# Patient Record
Sex: MALE | Race: WHITE | HISPANIC OR LATINO | Employment: OTHER | URBAN - METROPOLITAN AREA
[De-identification: names, ages, dates, MRNs, and addresses within clinical notes are randomized per-mention and may not be internally consistent; named-entity substitution may affect disease eponyms.]

---

## 2017-03-07 NOTE — PATIENT DISCUSSION
DRY EYES : Discussed with patient the importance of keeping the eye moist and the symptoms associated with dry eyes including blurry vision, tearing, burning, and gareth sensation. Tear Lab was performed today to evaluate the severity of dryness. Advised patient to minimize use of any fans blowing directly on the face. Advised patient to continue with over the counter artificial tears 2-3 times daily.

## 2019-05-06 NOTE — PATIENT DISCUSSION
DRY EYES : Discussed with patient the importance of keeping the eye moist and the symptoms associated with dry eyes including blurry vision, tearing, burning, and gareth sensation. Advised patient to minimize use of any fans blowing directly on the face. Advised patient to continue with artificial tears 2-3 times daily.

## 2019-05-06 NOTE — PATIENT DISCUSSION
CRANIAL NERVE 6 PALSY: NO OTHER SYSTEMIC RELATED ABNORMALITIES. OBSERVE CLOSELY . INFORM PRIMARY CARE PHYSICIAN OF DIAGNOSIS AND CONSIDER SYSTEMIC WORK UP BY PRIMARY CARE PHYSICIAN. RETURN FOR FOLLOW-UP AS SCHEDULED.

## 2019-05-06 NOTE — PATIENT DISCUSSION
PTERYGIUM:  I have advised the patient that pterygium are usually stable over time and that surgical intervention may be necessary if condition becomes visually significant or severely inflamed. The patient should avoid dry, melissa conditions and excessive sunlight. Artificial tears and sunglasses may help prevent exacerbation of the condition.

## 2019-12-16 NOTE — PATIENT DISCUSSION
CRANIAL NERVE 6th PALSY:  EXTROCULAR MOVEMENTS ARE GOOD . NO DIPLOPIA . RETURN FOR COMPLETE IN MAY .

## 2022-08-30 NOTE — PATIENT DISCUSSION
Progressive - Daily wearOD-0.50+1.2530+[id number]/30 -2&nbsp;SN &nbsp; &nbsp; emiln The ECG showed sinus rhythm.

## 2022-09-07 ENCOUNTER — NEW PATIENT (OUTPATIENT)
Dept: URBAN - METROPOLITAN AREA CLINIC 26 | Facility: CLINIC | Age: 67
End: 2022-09-07

## 2022-09-07 VITALS
SYSTOLIC BLOOD PRESSURE: 139 MMHG | HEART RATE: 101 BPM | BODY MASS INDEX: 21.49 KG/M2 | DIASTOLIC BLOOD PRESSURE: 84 MMHG | WEIGHT: 129 LBS | HEIGHT: 65 IN

## 2022-09-07 DIAGNOSIS — Z79.4: ICD-10-CM

## 2022-09-07 DIAGNOSIS — H43.811: ICD-10-CM

## 2022-09-07 DIAGNOSIS — H43.12: ICD-10-CM

## 2022-09-07 DIAGNOSIS — E11.3513: ICD-10-CM

## 2022-09-07 PROCEDURE — 92134 CPTRZ OPH DX IMG PST SGM RTA: CPT

## 2022-09-07 PROCEDURE — J3490AVA AVASTIN

## 2022-09-07 PROCEDURE — 67028 INJECTION EYE DRUG: CPT

## 2022-09-07 PROCEDURE — 99204 OFFICE O/P NEW MOD 45 MIN: CPT

## 2022-09-07 PROCEDURE — 92235 FLUORESCEIN ANGRPH MLTIFRAME: CPT

## 2022-09-07 PROCEDURE — 67210 TREATMENT OF RETINAL LESION: CPT

## 2022-09-07 ASSESSMENT — VISUAL ACUITY
OD_SC: 20/70
OS_SC: 20/50+1
OS_PH: 20/25+1

## 2022-09-07 ASSESSMENT — TONOMETRY
OS_IOP_MMHG: 17
OD_IOP_MMHG: 18

## 2022-09-13 ENCOUNTER — CLINIC PROCEDURE ONLY (OUTPATIENT)
Dept: URBAN - METROPOLITAN AREA CLINIC 26 | Facility: CLINIC | Age: 67
End: 2022-09-13

## 2022-09-13 DIAGNOSIS — E11.3513: ICD-10-CM

## 2022-09-13 PROCEDURE — J3490AVA AVASTIN

## 2022-09-13 PROCEDURE — 67028 INJECTION EYE DRUG: CPT

## 2022-09-13 ASSESSMENT — TONOMETRY: OS_IOP_MMHG: 17

## 2022-10-12 ENCOUNTER — CLINIC PROCEDURE ONLY (OUTPATIENT)
Dept: URBAN - METROPOLITAN AREA CLINIC 26 | Facility: CLINIC | Age: 67
End: 2022-10-12

## 2022-10-12 DIAGNOSIS — E11.3513: ICD-10-CM

## 2022-10-12 DIAGNOSIS — H43.811: ICD-10-CM

## 2022-10-12 PROCEDURE — 92134 CPTRZ OPH DX IMG PST SGM RTA: CPT

## 2022-10-12 PROCEDURE — J3490AVA AVASTIN

## 2022-10-12 PROCEDURE — 67028 INJECTION EYE DRUG: CPT

## 2022-10-12 ASSESSMENT — TONOMETRY
OD_IOP_MMHG: 15
OD_IOP_MMHG: 23

## 2022-10-25 ENCOUNTER — CLINIC PROCEDURE ONLY (OUTPATIENT)
Dept: URBAN - METROPOLITAN AREA CLINIC 26 | Facility: CLINIC | Age: 67
End: 2022-10-25

## 2022-10-25 DIAGNOSIS — E11.3513: ICD-10-CM

## 2022-10-25 DIAGNOSIS — H43.12: ICD-10-CM

## 2022-10-25 PROCEDURE — 92250 FUNDUS PHOTOGRAPHY W/I&R: CPT

## 2022-10-25 PROCEDURE — 67028 INJECTION EYE DRUG: CPT

## 2022-10-25 PROCEDURE — J3490AVA AVASTIN

## 2022-10-25 ASSESSMENT — TONOMETRY: OS_IOP_MMHG: 13

## 2022-11-29 ENCOUNTER — CLINIC PROCEDURE ONLY (OUTPATIENT)
Dept: URBAN - METROPOLITAN AREA CLINIC 26 | Facility: CLINIC | Age: 67
End: 2022-11-29

## 2022-11-29 DIAGNOSIS — H43.811: ICD-10-CM

## 2022-11-29 DIAGNOSIS — E11.3513: ICD-10-CM

## 2022-11-29 PROCEDURE — J3490AVA AVASTIN

## 2022-11-29 PROCEDURE — 92134 CPTRZ OPH DX IMG PST SGM RTA: CPT

## 2022-11-29 PROCEDURE — 67028 INJECTION EYE DRUG: CPT

## 2022-11-29 ASSESSMENT — TONOMETRY: OD_IOP_MMHG: 19

## 2022-12-06 ENCOUNTER — CLINIC PROCEDURE ONLY (OUTPATIENT)
Dept: URBAN - METROPOLITAN AREA CLINIC 26 | Facility: CLINIC | Age: 67
End: 2022-12-06

## 2022-12-06 PROCEDURE — 67028 INJECTION EYE DRUG: CPT

## 2022-12-06 PROCEDURE — 92250 FUNDUS PHOTOGRAPHY W/I&R: CPT

## 2022-12-06 PROCEDURE — J3490AVA AVASTIN

## 2022-12-06 ASSESSMENT — TONOMETRY: OS_IOP_MMHG: 16

## 2023-04-05 ENCOUNTER — FOLLOW UP (OUTPATIENT)
Dept: URBAN - METROPOLITAN AREA CLINIC 26 | Facility: CLINIC | Age: 68
End: 2023-04-05

## 2023-04-05 VITALS — HEIGHT: 62 IN | WEIGHT: 128 LBS | BODY MASS INDEX: 23.55 KG/M2

## 2023-04-05 DIAGNOSIS — E11.3513: ICD-10-CM

## 2023-04-05 DIAGNOSIS — Z79.4: ICD-10-CM

## 2023-04-05 DIAGNOSIS — H43.12: ICD-10-CM

## 2023-04-05 DIAGNOSIS — H43.811: ICD-10-CM

## 2023-04-05 DIAGNOSIS — H40.023: ICD-10-CM

## 2023-04-05 PROCEDURE — 92014 COMPRE OPH EXAM EST PT 1/>: CPT

## 2023-04-05 PROCEDURE — 67028 INJECTION EYE DRUG: CPT

## 2023-04-05 PROCEDURE — 92134 CPTRZ OPH DX IMG PST SGM RTA: CPT

## 2023-04-05 PROCEDURE — 92250 FUNDUS PHOTOGRAPHY W/I&R: CPT

## 2023-04-05 ASSESSMENT — VISUAL ACUITY
OS_SC: 20/80+1
OS_PH: 20/30-1
OD_SC: 20/30-2

## 2023-04-05 ASSESSMENT — TONOMETRY
OS_IOP_MMHG: 13
OD_IOP_MMHG: 14

## 2023-04-12 ENCOUNTER — CLINIC PROCEDURE ONLY (OUTPATIENT)
Dept: URBAN - METROPOLITAN AREA CLINIC 26 | Facility: CLINIC | Age: 68
End: 2023-04-12

## 2023-04-12 DIAGNOSIS — Z79.4: ICD-10-CM

## 2023-04-12 DIAGNOSIS — E11.3513: ICD-10-CM

## 2023-04-12 PROCEDURE — 67028 INJECTION EYE DRUG: CPT

## 2023-04-12 ASSESSMENT — TONOMETRY: OS_IOP_MMHG: 18

## 2023-06-06 ENCOUNTER — FOLLOW UP (OUTPATIENT)
Dept: URBAN - METROPOLITAN AREA CLINIC 26 | Facility: CLINIC | Age: 68
End: 2023-06-06

## 2023-06-06 DIAGNOSIS — H43.12: ICD-10-CM

## 2023-06-06 DIAGNOSIS — E11.3513: ICD-10-CM

## 2023-06-06 DIAGNOSIS — H43.811: ICD-10-CM

## 2023-06-06 DIAGNOSIS — H40.023: ICD-10-CM

## 2023-06-06 DIAGNOSIS — Z79.4: ICD-10-CM

## 2023-06-06 PROCEDURE — 67210 TREATMENT OF RETINAL LESION: CPT

## 2023-06-06 PROCEDURE — 67028 INJECTION EYE DRUG: CPT

## 2023-06-06 PROCEDURE — 92134 CPTRZ OPH DX IMG PST SGM RTA: CPT

## 2023-06-06 PROCEDURE — 92014 COMPRE OPH EXAM EST PT 1/>: CPT

## 2023-06-06 ASSESSMENT — VISUAL ACUITY
OD_SC: 20/20-2
OS_PH: 20/30+2
OS_SC: 20/70-1

## 2023-06-06 ASSESSMENT — TONOMETRY
OS_IOP_MMHG: 16
OD_IOP_MMHG: 17

## 2023-06-13 ENCOUNTER — CLINIC PROCEDURE ONLY (OUTPATIENT)
Dept: URBAN - METROPOLITAN AREA CLINIC 26 | Facility: CLINIC | Age: 68
End: 2023-06-13

## 2023-06-13 DIAGNOSIS — E11.3513: ICD-10-CM

## 2023-06-13 PROCEDURE — 67028 INJECTION EYE DRUG: CPT

## 2023-06-13 ASSESSMENT — TONOMETRY
OS_IOP_MMHG: 18
OS_IOP_MMHG: 27
OS_IOP_MMHG: 29

## 2024-01-10 ENCOUNTER — COMPREHENSIVE EXAM (OUTPATIENT)
Dept: URBAN - METROPOLITAN AREA CLINIC 26 | Facility: CLINIC | Age: 69
End: 2024-01-10

## 2024-01-10 DIAGNOSIS — Z79.4: ICD-10-CM

## 2024-01-10 DIAGNOSIS — H40.023: ICD-10-CM

## 2024-01-10 DIAGNOSIS — H25.13: ICD-10-CM

## 2024-01-10 DIAGNOSIS — H43.811: ICD-10-CM

## 2024-01-10 DIAGNOSIS — E11.3513: ICD-10-CM

## 2024-01-10 DIAGNOSIS — H43.12: ICD-10-CM

## 2024-01-10 PROCEDURE — 92250 FUNDUS PHOTOGRAPHY W/I&R: CPT

## 2024-01-10 PROCEDURE — 67028 INJECTION EYE DRUG: CPT

## 2024-01-10 PROCEDURE — J3490AVA AVASTIN *

## 2024-01-10 PROCEDURE — 92134 CPTRZ OPH DX IMG PST SGM RTA: CPT

## 2024-01-10 PROCEDURE — 92014 COMPRE OPH EXAM EST PT 1/>: CPT

## 2024-01-10 ASSESSMENT — VISUAL ACUITY
OD_SC: 20/30-2
OS_PH: 20/40-1
OS_SC: 20/50-2

## 2024-01-10 ASSESSMENT — TONOMETRY
OS_IOP_MMHG: 19
OD_IOP_MMHG: 20

## 2024-01-16 ENCOUNTER — CLINIC PROCEDURE ONLY (OUTPATIENT)
Dept: URBAN - METROPOLITAN AREA CLINIC 26 | Facility: CLINIC | Age: 69
End: 2024-01-16

## 2024-01-16 DIAGNOSIS — E11.3513: ICD-10-CM

## 2024-01-16 DIAGNOSIS — Z79.4: ICD-10-CM

## 2024-01-16 PROCEDURE — J3490AVA AVASTIN *

## 2024-01-16 PROCEDURE — 67028 INJECTION EYE DRUG: CPT

## 2024-01-16 ASSESSMENT — TONOMETRY: OS_IOP_MMHG: 19

## 2024-02-14 ENCOUNTER — CLINIC PROCEDURE ONLY (OUTPATIENT)
Dept: URBAN - METROPOLITAN AREA CLINIC 26 | Facility: CLINIC | Age: 69
End: 2024-02-14

## 2024-02-14 DIAGNOSIS — Z79.4: ICD-10-CM

## 2024-02-14 DIAGNOSIS — E11.3513: ICD-10-CM

## 2024-02-14 PROCEDURE — 92134 CPTRZ OPH DX IMG PST SGM RTA: CPT

## 2024-02-14 PROCEDURE — J3490AVA AVASTIN *

## 2024-02-14 PROCEDURE — 67028 INJECTION EYE DRUG: CPT

## 2024-02-14 ASSESSMENT — TONOMETRY: OD_IOP_MMHG: 20

## 2025-07-10 ENCOUNTER — DASHBOARD ENCOUNTERS (OUTPATIENT)
Age: 70
End: 2025-07-10

## 2025-07-10 ENCOUNTER — OFFICE VISIT (OUTPATIENT)
Dept: URBAN - METROPOLITAN AREA CLINIC 60 | Facility: CLINIC | Age: 70
End: 2025-07-10
Payer: COMMERCIAL

## 2025-07-10 DIAGNOSIS — R12 HEARTBURN: ICD-10-CM

## 2025-07-10 DIAGNOSIS — Z12.11 COLON CANCER SCREENING: ICD-10-CM

## 2025-07-10 DIAGNOSIS — K59.09 CONSTIPATION, CHRONIC: ICD-10-CM

## 2025-07-10 PROCEDURE — 99204 OFFICE O/P NEW MOD 45 MIN: CPT | Performed by: INTERNAL MEDICINE

## 2025-07-10 RX ORDER — DOCUSATE SODIUM 100 MG/1
1 CAPSULE AS NEEDED CAPSULE ORAL ONCE A DAY
Qty: 30 | Refills: 3 | OUTPATIENT
Start: 2025-07-10 | End: 2025-08-09

## 2025-07-10 RX ORDER — METFORMIN HCL 1000 MG/1
TABLET, FILM COATED ORAL
Qty: 90 TABLET | Status: ACTIVE | COMMUNITY

## 2025-07-10 RX ORDER — EMPAGLIFLOZIN 25 MG/1
TABLET, FILM COATED ORAL
Qty: 90 TABLET | Status: ACTIVE | COMMUNITY

## 2025-07-10 RX ORDER — CARBIDOPA AND LEVODOPA 25; 250 MG/1; MG/1
TABLET ORAL
Qty: 270 TABLET | Status: ACTIVE | COMMUNITY

## 2025-07-10 RX ORDER — TAMSULOSIN HYDROCHLORIDE 0.4 MG/1
CAPSULE ORAL
Qty: 90 CAPSULE | Status: ACTIVE | COMMUNITY

## 2025-07-10 RX ORDER — SODIUM, POTASSIUM,MAG SULFATES 17.5-3.13G
AS DIRECTED SOLUTION, RECONSTITUTED, ORAL ORAL
Qty: 1 | Refills: 0 | OUTPATIENT
Start: 2025-07-10 | End: 2025-07-12

## 2025-07-10 RX ORDER — ENALAPRIL MALEATE 20 MG/1
TAKE ONE TABLET BY MOUTH ONE TIME DAILY TABLET ORAL
Qty: 90 UNSPECIFIED | Refills: 0 | Status: ACTIVE | COMMUNITY

## 2025-07-10 RX ORDER — AMLODIPINE BESYLATE 2.5 MG/1
TABLET ORAL
Qty: 90 TABLET | Status: ACTIVE | COMMUNITY

## 2025-07-10 RX ORDER — POLYETHYLENE GLYCOL 3350 17 G/DOSE
1 SCOOP MIXED WITH 8 OUNCES OF FLUID POWDER (GRAM) ORAL ONCE A DAY
Qty: 90 | OUTPATIENT
Start: 2025-07-10 | End: 2025-10-08

## 2025-07-10 NOTE — HPI-TODAY'S VISIT:
70 y M w hx of BPH, Parkinson's dz, chronic spine issues, referred by pcp to our GI clinic for eval of constipation, abd bloating, had tried OTC laxatives, does not remember name of med Pt reports BM q 2 days, no bloody Never had a colonoscopy done no blood in stools fmhx neg for colon ca no n/v unfrequent heartburn depending on what he eats, counseled on antireflux diet wt is stable

## 2025-07-10 NOTE — EXAM-PHYSICAL EXAM
gen: NAD neuro: parkinson's mov, aox3 cv: RRR chest: no accesssory muscles of resp abd: ND, soft, NT, no hepato/splenomegaly ext: no edema psych: no agitation

## 2025-07-28 ENCOUNTER — CLAIMS CREATED FROM THE CLAIM WINDOW (OUTPATIENT)
Dept: URBAN - METROPOLITAN AREA CLINIC 4 | Facility: CLINIC | Age: 70
End: 2025-07-28
Payer: COMMERCIAL

## 2025-07-28 ENCOUNTER — OFFICE VISIT (OUTPATIENT)
Dept: URBAN - METROPOLITAN AREA SURGERY CENTER 4 | Facility: SURGERY CENTER | Age: 70
End: 2025-07-28
Payer: COMMERCIAL

## 2025-07-28 ENCOUNTER — CLAIMS CREATED FROM THE CLAIM WINDOW (OUTPATIENT)
Dept: URBAN - METROPOLITAN AREA SURGERY CENTER 4 | Facility: SURGERY CENTER | Age: 70
End: 2025-07-28

## 2025-07-28 DIAGNOSIS — K63.5 COLON POLYP: ICD-10-CM

## 2025-07-28 DIAGNOSIS — K64.8 OTHER HEMORRHOIDS: ICD-10-CM

## 2025-07-28 DIAGNOSIS — K57.30 DIVERTICULOSIS OF LARGE INTESTINE WITHOUT PERFORATION OR ABSCESS WITHOUT BLEEDING: ICD-10-CM

## 2025-07-28 DIAGNOSIS — K62.1 RECTAL POLYP: ICD-10-CM

## 2025-07-28 DIAGNOSIS — Z12.11 ENCOUNTER FOR SCREENING FOR MALIGNANT NEOPLASM OF COLON: ICD-10-CM

## 2025-07-28 DIAGNOSIS — K63.5 POLYP OF COLON: ICD-10-CM

## 2025-07-28 PROCEDURE — 88305 TISSUE EXAM BY PATHOLOGIST: CPT | Performed by: PATHOLOGY

## 2025-07-28 PROCEDURE — 45380 COLONOSCOPY AND BIOPSY: CPT | Performed by: INTERNAL MEDICINE

## 2025-07-28 RX ORDER — ENALAPRIL MALEATE 20 MG/1
TAKE ONE TABLET BY MOUTH ONE TIME DAILY TABLET ORAL
Qty: 90 UNSPECIFIED | Refills: 0 | Status: ACTIVE | COMMUNITY

## 2025-07-28 RX ORDER — POLYETHYLENE GLYCOL 3350 17 G/DOSE
1 SCOOP MIXED WITH 8 OUNCES OF FLUID POWDER (GRAM) ORAL ONCE A DAY
Qty: 90 | Status: ACTIVE | COMMUNITY
Start: 2025-07-10 | End: 2025-10-08

## 2025-07-28 RX ORDER — CARBIDOPA AND LEVODOPA 25; 250 MG/1; MG/1
TABLET ORAL
Qty: 270 TABLET | Status: ACTIVE | COMMUNITY

## 2025-07-28 RX ORDER — EMPAGLIFLOZIN 25 MG/1
TABLET, FILM COATED ORAL
Qty: 90 TABLET | Status: ACTIVE | COMMUNITY

## 2025-07-28 RX ORDER — DOCUSATE SODIUM 100 MG/1
1 CAPSULE AS NEEDED CAPSULE ORAL ONCE A DAY
Qty: 30 | Refills: 3 | Status: ACTIVE | COMMUNITY
Start: 2025-07-10 | End: 2025-08-09

## 2025-07-28 RX ORDER — AMLODIPINE BESYLATE 2.5 MG/1
TABLET ORAL
Qty: 90 TABLET | Status: ACTIVE | COMMUNITY

## 2025-07-28 RX ORDER — METFORMIN HCL 1000 MG/1
TABLET, FILM COATED ORAL
Qty: 90 TABLET | Status: ACTIVE | COMMUNITY

## 2025-07-28 RX ORDER — TAMSULOSIN HYDROCHLORIDE 0.4 MG/1
CAPSULE ORAL
Qty: 90 CAPSULE | Status: ACTIVE | COMMUNITY

## 2025-08-22 ENCOUNTER — OFFICE VISIT (OUTPATIENT)
Dept: URBAN - METROPOLITAN AREA CLINIC 60 | Facility: CLINIC | Age: 70
End: 2025-08-22